# Patient Record
Sex: FEMALE | Race: WHITE | Employment: OTHER | URBAN - METROPOLITAN AREA
[De-identification: names, ages, dates, MRNs, and addresses within clinical notes are randomized per-mention and may not be internally consistent; named-entity substitution may affect disease eponyms.]

---

## 2017-06-16 ENCOUNTER — HOSPITAL ENCOUNTER (OUTPATIENT)
Dept: ENDOSCOPY | Age: 77
Discharge: HOME | End: 2017-06-16
Attending: INTERNAL MEDICINE | Admitting: INTERNAL MEDICINE

## 2017-06-16 VITALS
OXYGEN SATURATION: 97 % | SYSTOLIC BLOOD PRESSURE: 179 MMHG | WEIGHT: 222 LBS | HEART RATE: 58 BPM | RESPIRATION RATE: 20 BRPM | HEIGHT: 61 IN | DIASTOLIC BLOOD PRESSURE: 62 MMHG | TEMPERATURE: 97.9 F | BODY MASS INDEX: 41.91 KG/M2

## 2017-06-16 LAB
GLUCOSE BLD-MCNC: 109 MG/DL (ref 70–99)
PERFORMED ON: ABNORMAL

## 2017-06-16 RX ORDER — ASPIRIN 81 MG/1
81 TABLET, CHEWABLE ORAL DAILY
COMMUNITY
End: 2017-07-11 | Stop reason: HOSPADM

## 2017-06-16 ASSESSMENT — PAIN - FUNCTIONAL ASSESSMENT: PAIN_FUNCTIONAL_ASSESSMENT: 0-10

## 2017-07-11 ENCOUNTER — HOSPITAL ENCOUNTER (OUTPATIENT)
Dept: ENDOSCOPY | Age: 77
Discharge: HOME | End: 2017-07-11
Attending: INTERNAL MEDICINE | Admitting: INTERNAL MEDICINE

## 2017-07-11 VITALS
OXYGEN SATURATION: 96 % | HEIGHT: 61 IN | HEART RATE: 67 BPM | DIASTOLIC BLOOD PRESSURE: 103 MMHG | TEMPERATURE: 98.2 F | SYSTOLIC BLOOD PRESSURE: 167 MMHG | WEIGHT: 222 LBS | RESPIRATION RATE: 18 BRPM | BODY MASS INDEX: 41.91 KG/M2

## 2017-07-11 LAB
GLUCOSE BLD-MCNC: 125 MG/DL (ref 70–99)
PERFORMED ON: ABNORMAL

## 2017-08-22 ENCOUNTER — HOSPITAL ENCOUNTER (OUTPATIENT)
Dept: ENDOSCOPY | Age: 77
Discharge: HOME | End: 2017-08-22
Attending: INTERNAL MEDICINE | Admitting: INTERNAL MEDICINE

## 2017-08-22 VITALS
DIASTOLIC BLOOD PRESSURE: 61 MMHG | SYSTOLIC BLOOD PRESSURE: 180 MMHG | HEIGHT: 61 IN | OXYGEN SATURATION: 97 % | TEMPERATURE: 98 F | BODY MASS INDEX: 42.48 KG/M2 | WEIGHT: 225 LBS | RESPIRATION RATE: 16 BRPM | HEART RATE: 62 BPM

## 2017-08-22 LAB
GLUCOSE BLD-MCNC: 117 MG/DL (ref 70–99)
PERFORMED ON: ABNORMAL

## 2017-08-22 RX ORDER — PANTOPRAZOLE SODIUM 40 MG/1
1 TABLET, DELAYED RELEASE ORAL DAILY
Status: ON HOLD | COMMUNITY
Start: 2017-07-11 | End: 2019-10-01 | Stop reason: ALTCHOICE

## 2017-10-03 ENCOUNTER — HOSPITAL ENCOUNTER (OUTPATIENT)
Dept: ENDOSCOPY | Age: 77
Discharge: HOME | End: 2017-10-03
Attending: INTERNAL MEDICINE | Admitting: INTERNAL MEDICINE

## 2017-10-03 VITALS
HEART RATE: 56 BPM | TEMPERATURE: 98.4 F | BODY MASS INDEX: 41.72 KG/M2 | DIASTOLIC BLOOD PRESSURE: 69 MMHG | OXYGEN SATURATION: 95 % | WEIGHT: 221 LBS | SYSTOLIC BLOOD PRESSURE: 181 MMHG | RESPIRATION RATE: 18 BRPM | HEIGHT: 61 IN

## 2017-10-03 LAB
GLUCOSE BLD-MCNC: 118 MG/DL (ref 70–99)
PERFORMED ON: ABNORMAL

## 2017-10-03 NOTE — IP AVS SNAPSHOT
Patient Information     Patient Name LÓPEZ Song Leader 1940         This is your updated medication list to keep with you all times      ASK your doctor about these medications     aspirin 81 MG tablet       atenolol 25 MG tablet   Commonly known as:  TENORMIN       atorvastatin 40 MG tablet   Commonly known as:  LIPITOR       CENTRUM SILVER PO       chlorthalidone 25 MG tablet   Commonly known as:  HYGROTON       FISH OIL + D3 2218-8991 MG-UNIT Caps       LOSARTAN POTASSIUM PO       metFORMIN 500 MG tablet   Commonly known as:  GLUCOPHAGE       NIFEdipine 30 MG extended release tablet   Commonly known as:  ADALAT CC       pantoprazole 40 MG tablet   Commonly known as:  PROTONIX       Vitamin C 500 MG Chew

## 2017-10-03 NOTE — PLAN OF CARE
as ordered  [x] Tolerating clear liquids  [x] D/C IV fluids   ACTIVITY [x] Assess level of function  [x] Specified by physician  [x]Activity as tolerated [x] Position on left side [x] Position on left side and reposition patient as physician ordered [x] Gradually elevate HOB to lopezs position  [x] Position changes as patient tolerated  [x] Ambulate as pre-procedure   PATIENT / SO EDUCATION [x] Pre,Intra, Post-procedure  teaching appropriate to procedure  [x]Conscious Sedation Teaching  [x] Pain Management - instructed [x] Encourage questions  [x] Clarify any concerns [x]Assist and support patient  [x]Safety devices maintain to  prevent patient injury  [x] Observe standard precautions [x] Physician confers with the family/S.O. [x] Short visit from family in [place] area  [x] Review discharge instructions, take home medicine to family /S.O.; questions clarified  [x] Physician specific post-procedure orders  [x] S/S complications with proper F/U  [x] F/U office visits  [] Med info sheet/ copy of discharge  instruction given to pt./S.O.   OUTCOME PLANNING  DISCHARGE PLAN [x] Patient/S. O. will verbalize understanding of admission  procedure & expectations of outcome in realistic terms  [x] Patient verbalize the role of family/S. O. in plan of care  [x] Patient will have designated responsible person available for discharge.  [x] Patient will demonstrate an  understanding of the planned  procedure and its related procedures and conscious sedation [x] Patient will:  - receive services according to the standards of care  - receive standards for conscious sedation  -  remain free of injury [x] Patient will:   - have stable vital signs based on Sadie Score  -  be pain free or tolerable, have no signs of difficulty in breathing  - no abdominal distention, severe sore throat, chest pain, bleeding  -  return to pre-procedure level of conciousness   - tolerate fluid with no N/V  - ambulate as pre-procedure ADL  - verbalize understanding of the discharge instructions      NURSE SIGNATURE: Petra Cecil    ____________________________   _______Cathgracee El paso  7:17 AM    _________________      _____Catherine [place] pas[place]  7:18 AM  _____________________ Mona Thomas  8:38 AM     _______________________________________                                                            [id number],EI,P2,7/43,J

## 2017-10-03 NOTE — OP NOTE
[address]               [address]                               OPERATIVE REPORT    PATIENT NAME: Po Ariza                     :             1940  MED REC NO:   [id number]                           ROOM:  ACCOUNT NO:   [de-identified]                           ADMISSION DATE:  10/03/2017  PROVIDER:     Court Abdul MD    DATE OF PROCEDURE:  10/03/2017    INDICATION FOR PROCEDURE:   Umaña's esophagus-radiofrequency  ablation-followup. PROCEDURE:  With the patient in the left lateral position and after 50  mg of Demerol and 4 mg of Versed IV, the Olympus video endoscope was  introduced into esophagus and advanced toward the GE junction where  the visual Umaña's was noted. Satisfactory ablation of previously  ablated areas were noted. Radiofrequency ablation was performed in  the residual Umaña's using TTS electrode. Stomach revealed mild  antral gastritis and biopsies were obtained for CLL test.  The  duodenum was normal.  Scope was removed without complication. IMPRESSION:  1. Residual Umaña's esophagus. 2.  Mild antral gastritis.         Cathy Farmer MD    D: 10/03/2017 8:44:25       T: 10/03/2017 12:18:47     [email or web address]  Job#: [id number]     Doc#: [id number]

## 2017-10-03 NOTE — H&P
[facility]              [address]                             HISTORY AND PHYSICAL    PATIENT NAME: Laurita Mendoza                     :             1940  MED REC NO:   [id number]                           ROOM:  ACCOUNT NO:   [de-identified]                           ADMISSION DATE:  10/03/2017  PROVIDER:     Flako Sharma MD    HISTORY OF PRESENT ILLNESS:  The patient was seen today and evaluated  in regard to her hypertension, hyperlipidemia, osteoarthritis, and  obesity. 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 MEDICATIONS:  List include aspirin 81 mg a day, Protonix 40 mg a day,  fish oil, metformin 500 mg for diabetes daily, Lipitor, Hygroton,  Tenormin, Atarax, losartan, vitamin D with calcium, and multivitamin. In regards to her diabetes, I have instructed not to take her  metformin this morning. We obtained fasting blood sugar on admission,  was reported to be 118. The patient has not had any shortness of breath, chest pain, cough,  wheezing, fever. No history of coronary artery disease. PHYSICAL EXAMINATION:  VITAL SIGNS:  Normal vital signs. HEAD, EARS, EYES, NOSE, AND THROAT:  Unremarkable. NECK:  Supple. No adenopathy. No thyromegaly. No JVD. HEART:  Regular sinus rhythm. No murmur or gallop. LUNGS:  Clear to auscultation. ABDOMEN:  Soft, nontender, no masses. Bowel sounds are normal.  EXTREMITIES:  No edema and pulses normal.  NEUROLOGIC:  No motor or sensory deficit. PLAN:  The patient's condition is stable to proceed with endoscopy and  radiofrequency ablation. We will instruct regarding restarting her  Glucophage and rest of her medication. We will hold aspirin for a  week post ablation.         Jorge Luis Morocho MD    D: 10/03/2017 8:56:28       T: 10/03/2017 13:07:02     [email or web address]  Job#: [id number] Doc#: [id number]

## 2017-10-03 NOTE — IP AVS SNAPSHOT
Last Vitals          Most Recent Value    Temp  98.4 °F (36.9 °C)    Pulse  56    Resp  18    BP  (!)  181/69         After Visit Summary    This summary was created for you. Thank you for entrusting your care to us. The following information includes details about your hospital/visit stay along with steps you should take to help with your recovery once you leave the hospital.  In this packet, you will find information about the topics listed below:    · Instructions about your medications including a list of your home medications  · A summary of your hospital visit  · Follow-up appointments once you have left the hospital  · Your care plan at home      You may receive a survey regarding the care you received during your stay. Your input is valuable to us. We encourage you to complete and return your survey in the envelope provided. We hope you will choose us in the future for your healthcare needs. Patient Information     Patient Name LÓPEZ Cerrato 1940      Care Provided at:     Name Address Phone       [facility] [place] [address] [id number] [phone]            Your Visit    Here you will find information about your visit, including the reason for your visit. Please take this sheet with you when you visit your doctor or other health care provider in the future. It will help determine the best possible medical care for you at that time. If you have any questions once you leave the hospital, please call the department phone number listed below. Why you were here     Your primary diagnosis was:  Not on File      Visit Information     Date & Time Provider Department Dept. Phone    10/3/2017 Olga Madrigal MD [facility] End[facility] [phone]       Follow-up Appointments    Below is a list of your follow-up and future appointments.  This may not be a complete list as you may have made appointments directly with providers If the intravenous medication site is painful, apply warm compresses on the site until the soreness is relieved and elevate the arm above the heart. Call your physician if no improvement  in 2-3 days. POSSIBLE SYMPTOMS TO WATCH:     1. fever (greater than 100) 5. increased abdominal bloating   2. severe pain   6. excessive bleeding   3. nausea and vomiting  7. chest pain   4. chills    8. shortness of breath       Notify your physician if these problems occur     Expected as normal and remedies:  1. Sore throat: use over the counter throat lozenges or gargle with warm salt water. 2. Redness or soreness at the IV site: apply warm compress  3. Gaseous discomfort: belching or passing flatus (gas). Call for follow-up appointment in:    2 weeks                Educational information given on:_______________________                       We want to thank you for choosing the [facility]. as your health care provider. We hope that you received excellent care while you were here. You may receive a survey in the mail regarding your care. We would appreciate you taking a  few minutes of your time to complete this survey. Again, thank you for choosing the [facility]                 Please review these discharge instructions this evening or tomorrow for               information you may have forgotten. Important information for a smoker       SMOKING: QUIT SMOKING. THIS IS THE MOST IMPORTANT ACTION YOU CAN TAKE TO IMPROVE YOUR CURRENT AND FUTURE HEALTH. Call the [facility] NOW ([phone])    Smoking harms nonsmokers. When nonsmokers are around people who smoke, they absorb nicotine, carbon monoxide, and other ingredients of tobacco smoke.      DO NOT SMOKE AROUND CHILDREN     Children exposed to secondhand smoke are at an increased risk of:  Sudden Infant Death Syndrome (SIDS), acute respiratory infections, inflammation of the middle ear, and severe asthma. Over a longer time, it causes heart disease and lung cancer. There is no safe level of exposure to secondhand smoke. MyChart Signup     IP[employer]pert allows you to send messages to your doctor, view your test results, renew your prescriptions, schedule appointments, view visit notes, and more. How Do I Sign Up? 1. In your Internet browser, go to [email or web address]  2. Click on the Sign Up Now link in the Sign In box. You will see the New Member Sign Up page. 3. Enter your IPexpert Access Code exactly as it appears below. You will not need to use this code after youve completed the sign-up process. If you do not sign up before the expiration date, you must request a new code. IPexpert Access Code: [email or web address]  Expires: 10/21/2017  9:16 AM    4. Enter your Social Security Number ([id number]) and Date of Birth (mm/dd/yyyy) as indicated and click Submit. You will be taken to the next sign-up page. 5. Create a IPexpert ID. This will be your IPexpert login ID and cannot be changed, so think of one that is secure and easy to remember. 6. Create a IPexpert password. You can change your password at any time. 7. Enter your Password Reset Question and Answer. This can be used at a later time if you forget your password. 8. Enter your e-mail address. You will receive e-mail notification when new information is available in [address] 9. Click Sign Up. You can now view your medical record. Additional Information  If you have questions, please contact the physician practice where you receive care. Remember, IPexpert is NOT to be used for urgent needs. For medical emergencies, dial 911. For questions regarding your IPexpert account call [phone]. If you have a clinical question, please call your doctor's office. View your information online  ? Review your current list of  medications, immunization, and allergies.

## 2017-10-03 NOTE — H&P
History and Physical / Pre-Sedation Assessment    Patient:  Kia Lafleur   :   1940     Intended Procedure:  EGd and RFA    HPI: katz esophagus. gerd  DM.   hypertension  Obesity   Osteoarthritis    Nurses notes reviewed and agreed. Medications reviewed  Allergies: No Known Allergies    Physical Exam:  Vital Signs: BP (!) 181/69  Pulse 56  Temp 98.4 °F (36.9 °C)  Resp 18  Ht 5' 1\" (1.549 m)  Wt 221 lb (100.2 kg)  SpO2 95%  BMI 41.76 kg/m2   Pulmonary:Normal  Cardiac:Normal  Abdomen:Normal    Pre-Procedure Assessment / Plan:  ASA 2 - Patient with mild systemic disease with no functional limitations  Level of Sedation Plan: Moderate sedation  Post Procedure plan: Return to same level of care    I assessed the patient and find that the patient is in satisfactory condition to proceed with the planned procedure and sedation plan. I have explained the risk, benefits, and alternatives to the procedure. The patient understands and agrees to proceed.   Risa Carrillo  9:11 AM 10/3/2017

## 2017-11-10 ENCOUNTER — HOSPITAL ENCOUNTER (OUTPATIENT)
Dept: ENDOSCOPY | Age: 77
Discharge: HOME | End: 2017-11-10
Attending: INTERNAL MEDICINE | Admitting: INTERNAL MEDICINE

## 2017-11-10 VITALS
HEIGHT: 61 IN | SYSTOLIC BLOOD PRESSURE: 138 MMHG | WEIGHT: 222 LBS | HEART RATE: 64 BPM | OXYGEN SATURATION: 98 % | TEMPERATURE: 97.9 F | DIASTOLIC BLOOD PRESSURE: 59 MMHG | BODY MASS INDEX: 41.91 KG/M2 | RESPIRATION RATE: 16 BRPM

## 2017-11-10 LAB
GLUCOSE BLD-MCNC: 132 MG/DL (ref 70–99)
PERFORMED ON: ABNORMAL

## 2017-11-10 NOTE — OP NOTE
[address]                  [address]                                 OPERATIVE REPORT    PATIENT NAME: Candis Phipps                    :        1940  MED REC NO:   [id number]                          ROOM:  ACCOUNT NO:   [de-identified]                          ADMIT DATE: 11/10/2017  PROVIDER:     Sujatha Cruz MD    DATE OF PROCEDURE:  11/10/2017    INDICATION FOR PROCEDURE:  This gentleman has Umaña's esophagus that has  required radiofrequency ablation. Today, she is having followup endoscope  and biopsies to insure complete ablation. PROCEDURE:  With the patient in the lateral position and after sedation  with 50 mg of Demerol and 3 mg of Versed IV, the Olympus video endoscope  was introduced into the esophagus and advanced towards the gastroesophageal  junction. Satisfactory ablation result was noted and biopsies from GE  junction/distal esophagus were obtained. Stomach was carefully inspected  and was unremarkable, except for minor antral gastrititis  The duodenum was normal.  Scope was then  removed without complication. IMPRESSION:  1. Very small hiatus hernia. 2.  Status post radiofrequency ablation for Umaña esophagus. Biopsies  were obtained. 3.  Minor antral gastritis.         Nena Neely MD    D: 11/10/2017 8:37:57       T: 11/10/2017 11:22:04     [email or web address]  Job#: [id number]     Doc#: [id number]

## 2017-11-10 NOTE — PLAN OF CARE
understanding of the discharge instructions      NURSE SIGNATURE:   ______________________Shannon Emaline Surya  ______   ________________________     __________________________   [facility]  8:32 AM   _______________________________________                                                            [id number],CI,A1,7/95,Z

## 2018-10-24 ENCOUNTER — HOSPITAL ENCOUNTER (OUTPATIENT)
Age: 78
Setting detail: OUTPATIENT SURGERY
Discharge: HOME | End: 2018-10-24
Attending: INTERNAL MEDICINE | Admitting: INTERNAL MEDICINE
Payer: MEDICARE

## 2018-10-24 VITALS
WEIGHT: 227 LBS | DIASTOLIC BLOOD PRESSURE: 63 MMHG | SYSTOLIC BLOOD PRESSURE: 152 MMHG | HEIGHT: 61 IN | RESPIRATION RATE: 18 BRPM | BODY MASS INDEX: 42.86 KG/M2 | HEART RATE: 51 BPM | TEMPERATURE: 98.4 F | OXYGEN SATURATION: 96 %

## 2018-10-24 LAB
GLUCOSE BLD-MCNC: 113 MG/DL (ref 70–99)
PERFORMED ON: ABNORMAL

## 2018-10-24 PROCEDURE — 7100000011 HC PHASE II RECOVERY - ADDTL 15 MIN: Performed by: INTERNAL MEDICINE

## 2018-10-24 PROCEDURE — 6370000000 HC RX 637 (ALT 250 FOR IP): Performed by: INTERNAL MEDICINE

## 2018-10-24 PROCEDURE — 88305 TISSUE EXAM BY PATHOLOGIST: CPT

## 2018-10-24 PROCEDURE — 6360000002 HC RX W HCPCS: Performed by: INTERNAL MEDICINE

## 2018-10-24 PROCEDURE — 99152 MOD SED SAME PHYS/QHP 5/>YRS: CPT | Performed by: INTERNAL MEDICINE

## 2018-10-24 PROCEDURE — 2709999900 HC NON-CHARGEABLE SUPPLY: Performed by: INTERNAL MEDICINE

## 2018-10-24 PROCEDURE — 3609012400 HC EGD TRANSORAL BIOPSY SINGLE/MULTIPLE: Performed by: INTERNAL MEDICINE

## 2018-10-24 PROCEDURE — 7100000010 HC PHASE II RECOVERY - FIRST 15 MIN: Performed by: INTERNAL MEDICINE

## 2018-10-24 RX ORDER — MEPERIDINE HYDROCHLORIDE 50 MG/ML
INJECTION INTRAMUSCULAR; INTRAVENOUS; SUBCUTANEOUS PRN
Status: DISCONTINUED | OUTPATIENT
Start: 2018-10-24 | End: 2018-10-24 | Stop reason: HOSPADM

## 2018-10-24 RX ORDER — MIDAZOLAM HYDROCHLORIDE 1 MG/ML
INJECTION INTRAMUSCULAR; INTRAVENOUS PRN
Status: DISCONTINUED | OUTPATIENT
Start: 2018-10-24 | End: 2018-10-24 | Stop reason: HOSPADM

## 2018-10-24 ASSESSMENT — PAIN - FUNCTIONAL ASSESSMENT: PAIN_FUNCTIONAL_ASSESSMENT: 0-10

## 2018-10-24 NOTE — H&P
[address] [facility]              [address]                             HISTORY AND PHYSICAL    PATIENT NAME: Alaina Huynh                    :         1940  MED REC NO:   [id number]                          ROOM:  ACCOUNT NO:   [de-identified]                           ADMIT DATE:  10/24/2018  PROVIDER:     Rashel Zazueta MD    HISTORY OF PRESENT ILLNESS:  The patient was seen today and evaluated  prior to her procedure. The procedure risk was assessed, was felt to  be acceptable. We have evaluated her hyperlipidemia, hypertension,  osteoarthritis, obesity, and non-insulin dependent diabetes mellitus. I ordered fasting blood sugar on admission and was reported to be 113. We have asked her to hold her antidiabetic agent this morning. She  does not complain of any chest pain, shortness of breath. No fever. No urologic symptoms. PAST MEDICAL HISTORY:  Significant for appendectomy, right and left  knee replacement. She does have benign tumor removed from her kidneys  in the remote past.    MEDICATIONS:  Glucophage, fish oil, Lipitor, Hygroton, atenolol,  Adalat, Cozaar, multivitamins, Protonix. ALLERGIES:  No known allergies. SOCIAL HISTORY:  There is no history of alcohol or smoking. The  patient is presently retired. FAMILY HISTORY:  Noncontributory. REVIEW OF SYSTEMS:  Unremarkable, except for above-noted history. PHYSICAL EXAMINATION:  VITAL SIGNS:  Blood pressure 190/68, pulse 62, temperature 98.4,  respiratory rate 18. The patient weighed 227 pounds with BMI of  42.89. SKIN:  Normal.  HEAD, EARS, EYES, NOSE, AND THROAT:  Unremarkable. NECK:  Supple. No adenopathy. No thyromegaly. No bruits. HEART:  Regular sinus rhythm. No murmur or gallop. LUNGS:  Clear to auscultation and percussion. ABDOMEN:  Soft, obese, nontender. No masses, no organomegaly.   Bowel  sounds are normal.  EXTREMITIES:  Revealed no edema, and pulses normal.  NEUROLOGIC:  No motor or sensory deficits. ASSESSMENT:  Careful assessment of her condition was performed. She  appears stable to proceed with outpatient endoscopy. We will order  blood sugar after the procedure and instruct her regarding starting  her Glucophage again.     Lui Montoya MD    D: 10/24/2018 8:28:43       T: 10/24/2018 10:16:36     [email or web address]  Job#: [id number]     Doc#: [id number]    CC:

## 2018-10-24 NOTE — H&P
History and Physical / Pre-Sedation Assessment    Patient:  Cathie Ceja   :   1940     Intended Procedure:  egd    HPI: katz. Fu rfa    Nurses notes reviewed and agreed. Medications reviewed  Allergies: No Known Allergies    Physical Exam:  Vital Signs: BP (!) 190/68   Pulse 62   Temp 98.4 °F (36.9 °C)   Resp 18   Ht 5' 1\" (1.549 m)   Wt 227 lb (103 kg)   SpO2 95%   BMI 42.89 kg/m²    Pulmonary:Normal  Cardiac:Normal  Abdomen:Normal    Pre-Procedure Assessment / Plan:  ASA 2 - Patient with mild systemic disease with no functional limitations  Mallampati Airway Assessment:  Mallampati Class II - (soft palate, fauces & uvula are visible)  Level of Sedation Plan: Moderate sedation  Post Procedure plan: Return to same level of care    I assessed the patient and find that the patient is in satisfactory condition to proceed with the planned procedure and sedation plan. I have explained the risk, benefits, and alternatives to the procedure. The patient understands and agrees to proceed.   Ryne Hopson  8:17 AM 10/24/2018

## 2019-10-01 ENCOUNTER — HOSPITAL ENCOUNTER (OUTPATIENT)
Age: 79
Setting detail: OUTPATIENT SURGERY
Discharge: HOME | End: 2019-10-01
Attending: INTERNAL MEDICINE | Admitting: INTERNAL MEDICINE
Payer: MEDICARE

## 2019-10-01 VITALS
BODY MASS INDEX: 37.57 KG/M2 | TEMPERATURE: 97.5 F | OXYGEN SATURATION: 95 % | DIASTOLIC BLOOD PRESSURE: 59 MMHG | HEART RATE: 49 BPM | RESPIRATION RATE: 18 BRPM | WEIGHT: 199 LBS | HEIGHT: 61 IN | SYSTOLIC BLOOD PRESSURE: 132 MMHG

## 2019-10-01 PROCEDURE — 3609012400 HC EGD TRANSORAL BIOPSY SINGLE/MULTIPLE: Performed by: INTERNAL MEDICINE

## 2019-10-01 PROCEDURE — 7100000010 HC PHASE II RECOVERY - FIRST 15 MIN: Performed by: INTERNAL MEDICINE

## 2019-10-01 PROCEDURE — 7100000011 HC PHASE II RECOVERY - ADDTL 15 MIN: Performed by: INTERNAL MEDICINE

## 2019-10-01 PROCEDURE — 6360000002 HC RX W HCPCS: Performed by: INTERNAL MEDICINE

## 2019-10-01 PROCEDURE — 2709999900 HC NON-CHARGEABLE SUPPLY: Performed by: INTERNAL MEDICINE

## 2019-10-01 PROCEDURE — 88305 TISSUE EXAM BY PATHOLOGIST: CPT

## 2019-10-01 PROCEDURE — 6370000000 HC RX 637 (ALT 250 FOR IP): Performed by: INTERNAL MEDICINE

## 2019-10-01 PROCEDURE — 99152 MOD SED SAME PHYS/QHP 5/>YRS: CPT | Performed by: INTERNAL MEDICINE

## 2019-10-01 RX ORDER — MEPERIDINE HYDROCHLORIDE 50 MG/ML
INJECTION INTRAMUSCULAR; INTRAVENOUS; SUBCUTANEOUS PRN
Status: DISCONTINUED | OUTPATIENT
Start: 2019-10-01 | End: 2019-10-01 | Stop reason: ALTCHOICE

## 2019-10-01 RX ORDER — MIDAZOLAM HYDROCHLORIDE 1 MG/ML
INJECTION INTRAMUSCULAR; INTRAVENOUS PRN
Status: DISCONTINUED | OUTPATIENT
Start: 2019-10-01 | End: 2019-10-01 | Stop reason: ALTCHOICE

## 2019-10-01 ASSESSMENT — PAIN SCALES - GENERAL: PAINLEVEL_OUTOF10: 0

## 2019-10-01 ASSESSMENT — PAIN - FUNCTIONAL ASSESSMENT: PAIN_FUNCTIONAL_ASSESSMENT: 0-10

## (undated) DEVICE — FORCEPS BX L240CM DIA2.4MM L NDL RAD JAW 4 133340